# Patient Record
Sex: MALE | Race: WHITE | NOT HISPANIC OR LATINO | Employment: STUDENT | URBAN - METROPOLITAN AREA
[De-identification: names, ages, dates, MRNs, and addresses within clinical notes are randomized per-mention and may not be internally consistent; named-entity substitution may affect disease eponyms.]

---

## 2018-05-25 ENCOUNTER — OFFICE VISIT (OUTPATIENT)
Dept: DERMATOLOGY | Facility: CLINIC | Age: 23
End: 2018-05-25
Payer: COMMERCIAL

## 2018-05-25 DIAGNOSIS — D48.5 NEOPLASM OF UNCERTAIN BEHAVIOR OF SKIN: Primary | ICD-10-CM

## 2018-05-25 NOTE — LETTER
5/25/2018      RE: Tl Decker  [address]  [facility] [address]               Pictures were placed in Pt's chart today for future reference.          Type of service: Telemedicine consult provided at the request of the patient for advice regarding the diagnosis and treatment of the patients skin condition or lesion.     Time of service:     Date: May 29,2018    Time Service Began: 3:15 PM    Time Service Ended: 3:35 PM  Reason that telemedicine is appropriate:.Reason telemedicine is appropriate is patient patient required immediate assessment by a dermatologist for a serious skin concern     Mode of transmission: secure asynchronous Store and Forward transmission of pertinent history and digital images of the patient's skin lesion.       Location of originating and distant sites:    Originating site Patient location: [facility]; Distant site: [facility] (Dr. Sorin Dorsey)    S: Patient noted a dark small lesion on the tip of his right ear about one year ago. Since then, no enlargement of the lesion noted and no signs of symptoms related to the lesion's presence. Apparently no preceding trauma known to the patient.  O: Approximately 0.3 cm lesion on the rim of the right ear that is round and contains some central dark black pigment and on the outer rim of the lesion the color is brown red. Symmetrical in outer shape but asymmetry of color within the borders.  A: While very small, the pigments are dark and somewhat concerning. Differential would be harmless junctional pigmented nevus vs atypical or dysplastic nevus. Apparently the lesion is solitary which is more concerning than if similar lesions were present on the ears or face.   P: Would recommend a shave biopsy of the lesion because of the consideration of it being a  dysplastic nevus.  With patient's consent this could  be accomplishing in the [facility].     RENETTA Dorsey M.D.         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  He will need another surgical excision. He returned the call but is currently in [facility] where he plans to be until December.    I asked him if he could ask his colleagues for the name of a local surgeon or plastic surgeon but if he was not able to find one, I will try to locate one through networking.     CARMEL Dorsey M.D.       Dermatology Nurse

## 2018-05-25 NOTE — NURSING NOTE
How long has it been present? 1 year  Has it grown since you first noticed it? No  Has it bled? No  Is it tender or sore? No  Has it changed color since you first noticed it? No  Do you have other skin lesions nearby that look somewhat similar? No  Do you have a current serious medical problems you are dealing with? No  Roughly how large is it in millimeters? 0.5cm  Anatomic location of the lesion: right ear  Has the lesion ever been removed or treated before by yourself or a medical person? No     Patient understands that he will hear back regarding lesion next week. (Monday clinic is closed). He will contact clinic by Thursday if has not heard back

## 2018-05-25 NOTE — MR AVS SNAPSHOT
After Visit Summary   2018    Tl Decker    MRN: [id number]           Patient Information     Date Of Birth          1995        Visit Information        Provider Department      2018 10:00 AM Nurse,  Dermatology [facility] Dermatology        Today's Diagnoses     Neoplasm of uncertain behavior of skin    -  1       Follow-ups after your visit        Who to contact     Please call your clinic at [phone] to:    Ask questions about your health    Make or cancel appointments    Discuss your medicines    Learn about your test results    Speak to your doctor            Additional Information About Your Visit        MyChart Information     Pigit is an electronic gateway that provides easy, online access to your medical records. With Pigit, you can request a clinic appointment, read your test results, renew a prescription or communicate with your care team.     To sign up for Pigit visit the website at [email or web address]   You will be asked to enter the access code listed below, as well as some personal information. Please follow the directions to create your username and password.     Your access code is: [email or web address]  Expires: 2018  6:30 AM     Your access code will  in 90 days. If you need help or a new code, please contact your [facility] or call [phone] for assistance.        Care EveryWhere ID     This is your Care EveryWhere ID. This could be used by other organizations to access your [place] medical records  [id number]         Blood Pressure from Last 3 Encounters:   No data found for BP    Weight from Last 3 Encounters:   No data found for Wt              Today, you had the following     No orders found for display       Primary Care Provider Fax #    Physician No Ref-Primary [phone]       No address on file        Equal Access to Services     DALTON LEÓN AH: pietro Raymond  smith de la cruz waxdylan maria luisain hayaan kaitlinmitzy meyerjacky aaron. So [facility] [place] [phone].    ATENCIÓN: Si lior acevedo, tiene a diehl disposición servicios gratuitos de asistencia lingüística. Llame al [phone].    We comply with applicable federal civil rights laws and [place] laws. We do not discriminate on the basis of race, color, national origin, age, disability, sex, sexual orientation, or gender identity.            Thank you!     Thank you for choosing [facility] DERMATOLOG[facility]  for your care. Our goal is always to provide you with excellent care. Hearing back from our patients is one way we can continue to improve our services. Please take a few minutes to complete the written survey that you may receive in the mail after your visit with us. Thank you!             Your Updated Medication List - Protect others around you: Learn how to safely use, store and throw away your medicines at www.disposemymeds.org.      Notice  As of 5/25/2018 11:59 PM    You have not been prescribed any medications.

## 2018-05-25 NOTE — LETTER
5/25/2018      RE: Tl Hillemmanuel  [address]  [facility] [address]               Pictures were placed in Pt's chart today for future reference.          Type of service: Telemedicine consult provided at the request of the patient for advice regarding the diagnosis and treatment of the patients skin condition or lesion.     Time of service:     Date: May 29,2018    Time Service Began: 3:15 PM    Time Service Ended: 3:35 PM  Reason that telemedicine is appropriate:.Reason telemedicine is appropriate is patient patient required immediate assessment by a dermatologist for a serious skin concern     Mode of transmission: secure asynchronous Store and Forward transmission of pertinent history and digital images of the patient's skin lesion.       Location of originating and distant sites:    Originating site Patient location: [facility]; Distant site: [facility] (Dr. Sorin Dorsey)    S: Patient noted a dark small lesion on the tip of his right ear about one year ago. Since then, no enlargement of the lesion noted and no signs of symptoms related to the lesion's presence. Apparently no preceding trauma known to the patient.  O: Approximately 0.3 cm lesion on the rim of the right ear that is round and contains some central dark black pigment and on the outer rim of the lesion the color is brown red. Symmetrical in outer shape but asymmetry of color within the borders.  A: While very small, the pigments are dark and somewhat concerning. Differential would be harmless junctional pigmented nevus vs atypical or dysplastic nevus. Apparently the lesion is solitary which is more concerning than if similar lesions were present on the ears or face.   P: Would recommend a shave biopsy of the lesion because of the consideration of it being a  dysplastic nevus.  With patient's consent this could  be accomplishing in the [facility].     RENETTA Dorsey M.D.         Dermatology Nurse

## 2018-05-29 ENCOUNTER — TELEPHONE (OUTPATIENT)
Dept: DERMATOLOGY | Facility: CLINIC | Age: 23
End: 2018-05-29

## 2018-05-29 NOTE — TELEPHONE ENCOUNTER
Outward message stating caller was unavailable. No option to leave VM. Patient had a telederm visit Friday 5/25. Writer trying to reach patient with Dr Dorsey recommendations.    Dr Dorsey would like to see pt this Thursday at 1130 or 1230 for a bx.    If patient calls back please assist him with scheduling.   (0) independent

## 2018-05-29 NOTE — PROGRESS NOTES
Type of service: Telemedicine consult provided at the request of the patient for advice regarding the diagnosis and treatment of the patients skin condition or lesion.     Time of service:     Date: May 29,2018    Time Service Began: 3:15 PM    Time Service Ended: 3:35 PM  Reason that telemedicine is appropriate:.Reason telemedicine is appropriate is patient patient required immediate assessment by a dermatologist for a serious skin concern     Mode of transmission: secure asynchronous Store and Forward transmission of pertinent history and digital images of the patient's skin lesion.       Location of originating and distant sites:    Originating site Patient location: [facility]; Distant site: [facility] (Dr. Sorin Dorsey)    S: Patient noted a dark small lesion on the tip of his right ear about one year ago. Since then, no enlargement of the lesion noted and no signs of symptoms related to the lesion's presence. Apparently no preceding trauma known to the patient.  O: Approximately 0.3 cm lesion on the rim of the right ear that is round and contains some central dark black pigment and on the outer rim of the lesion the color is brown red. Symmetrical in outer shape but asymmetry of color within the borders.  A: While very small, the pigments are dark and somewhat concerning. Differential would be harmless junctional pigmented nevus vs atypical or dysplastic nevus. Apparently the lesion is solitary which is more concerning than if similar lesions were present on the ears or face.   P: Would recommend a shave biopsy of the lesion because of the consideration of it being a  dysplastic nevus.  With patient's consent this could  be accomplishing in the [facility].     RENETTA Dorsey M.D.

## 2018-05-31 ENCOUNTER — OFFICE VISIT (OUTPATIENT)
Dept: DERMATOLOGY | Facility: CLINIC | Age: 23
End: 2018-05-31
Payer: COMMERCIAL

## 2018-05-31 DIAGNOSIS — D22.21 NEVUS OF RIGHT EAR: Primary | ICD-10-CM

## 2018-05-31 ASSESSMENT — PAIN SCALES - GENERAL
PAINLEVEL: MILD PAIN (2)
PAINLEVEL: NO PAIN (0)

## 2018-05-31 NOTE — NURSING NOTE
Chief Complaint   Patient presents with     Derm Problem     Tl is here today for a biopsy on his right ear from telederm.      Edda Mckeon LPN

## 2018-05-31 NOTE — NURSING NOTE
Lidocaine injection   1.0mL once for one use, starting 5/31/2018 ending 5/31/2018,  2mL disp, R-0, injection  Injected by Dr. Dorsey

## 2018-05-31 NOTE — PATIENT INSTRUCTIONS

## 2018-05-31 NOTE — PROGRESS NOTES
SUBJECTIVE:  Alejandro came into our Telederm Clinic several days ago because he had been aware of a lesion on the tip of his right ear on the helix that was dark in color.  The lesion was photographed and was shown to be a roughly 2 mm macule with some features of atypical nevus or dysplastic nevus.  We suggested that he come in for a shave biopsy of this particular lesion.      He came in today, and he states that he is unable to see this, but it has been pointed out by other people that it is something that would be a possible concern.        OBJECTIVE:  On exam, it was a 2 mm, smooth-surfaced, barely raised papule on the very tip of the helix.  Dermoscopy shows a uniform black color with white dots within and no significant pigmentary asymmetry.      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      PLAN:  I recommend that we shave remove the lesion for purposes of biopsy.  I explained that if it should show severe atypia that further surgery might be necessary.  With his permission, I went ahead and anesthetized the site and did a shave removal of the lesion.  There was no pigment at the cut surface.  This lesion was removed.  A specimen was submitted for Dermatopathology.  The bleeding was stopped with ferric and aluminum chloride, and a light dressing was applied and aftercare instructions given.       MEDICATIONS:  Reviewed.        ALLERGIES:  Reviewed.       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      We will call him with the report when we have it available.  We did not do a complete skin exam.

## 2018-05-31 NOTE — LETTER
5/31/2018       RE: Tl Decker  [address]  [place] [address]     Dear Colleague,    Thank you for referring your patient, Tl Decker, to the [facility] DERMATOLOGY at [facility]. Please see a copy of my visit note below.     Dictation on: 05/31/2018  1:09 PM by: BRO SNIDER [[id number]]         Again, thank you for allowing me to participate in the care of your patient.      Sincerely,    BRO Snider MD

## 2018-05-31 NOTE — LETTER
Date:June 8, 2018      Patient was self referred, no letter generated. Do not send.        [facility] Health Information

## 2018-05-31 NOTE — TELEPHONE ENCOUNTER
Patient scheduled with Dr. Dorsey for biopsy at 12:30pm. Nurse called and left voicemail for patient confirming appointment at 12:30 pm.

## 2018-05-31 NOTE — MR AVS SNAPSHOT
After Visit Summary   5/31/2018    Tl Decker    MRN: [id number]           Patient Information     Date Of Birth          1995        Visit Information        Provider Department      5/31/2018 12:30 PM BRO Dorsey MD [facility] Dermatology        Care Instructions    Wound Care After a Biopsy    What is a skin biopsy?  A skin biopsy allows the doctor to examine a very small piece of tissue under the microscope to determine the diagnosis and the best treatment for the skin condition. A local anesthetic (numbing medicine)  is injected with a very small needle into the skin area to be tested. A small piece of skin is taken from the area. Sometimes a suture (stitch) is used.     What are the risks of a skin biopsy?  I will experience scar, bleeding, swelling, pain, crusting and redness. I may experience incomplete removal or recurrence. Risks of this procedure are excessive bleeding, bruising, infection, nerve damage, numbness, thick (hypertrophic or keloidal) scar and non-diagnostic biopsy.    How should I care for my wound for the first 24 hours?    Keep the wound dry and covered for 24 hours    If it bleeds, hold direct pressure on the area for 15 minutes. If bleeding does not stop then go to the emergency room    Avoid strenuous exercise the first 1-2 days or as your doctor instructs you    How should I care for the wound after 24 hours?    After 24 hours, remove the bandage    You may bathe or shower as normal    If you had a scalp biopsy, you can shampoo as usual and can use shower water to clean the biopsy site daily    Clean the wound twice a day with gentle soap and water    Do not scrub, be gentle    Apply white petroleum/Vaseline after cleaning the wound with a cotton swab or a clean finger, and keep the site covered with a Bandaid /bandage. Bandages are not necessary with a scalp biopsy    If you are unable to cover the site with a Bandaid /bandage, re-apply ointment 2-3  times a day to keep the site moist. Moisture will help with healing    Avoid strenuous activity for first 1-2 days    Avoid lakes, rivers, pools, and oceans until the stitches are removed or the site is healed    How do I clean my wound?    Wash hands thoroughly with soap or use hand  before all wound care    Clean the wound with gentle soap and water    Apply white petroleum/Vaseline  to wound after it is clean    Replace the Bandaid /bandage to keep the wound covered for the first few days or as instructed by your doctor    If you had a scalp biopsy, warm shower water to the area on a daily basis should suffice    What should I use to clean my wound?     Cotton-tipped applicators (Qtips )    White petroleum jelly (Vaseline ). Use a clean new container and use Q-tips to apply.    Bandaids   as needed    Gentle soap     How should I care for my wound long term?    Do not get your wound dirty    Keep up with wound care for one week or until the area is healed.    A small scab will form and fall off by itself when the area is completely healed. The area will be red and will become pink in color as it heals. Sun protection is very important for how your scar will turn out. Sunscreen with an SPF 30 or greater is recommended once the area is healed.    You should have some soreness but it should be mild and slowly go away over several days. Talk to your doctor about using tylenol for pain,    When should I call my doctor?  If you have increased:     Pain or swelling    Pus or drainage (clear or slightly yellow drainage is ok)    Temperature over 100F    Spreading redness or warmth around wound    When will I hear about my results?  The biopsy results can take 2-3 weeks to come back. The clinic will call you with the results, send you a [email or web address] message, or have you schedule a follow-up clinic or phone time to discuss the results. Contact our clinics if you do not hear from us in 3 weeks.     Who should I call  with questions?    [facility]: [phone]     [facility]: [phone]    For urgent needs outside of business hours call the [facility] at [phone] and ask for the dermatology resident on call              Follow-ups after your visit        Who to contact     Please call your clinic at [phone] to:    Ask questions about your health    Make or cancel appointments    Discuss your medicines    Learn about your test results    Speak to your doctor            Additional Information About Your Visit        DataSiftharPushCall Information     Upstream is an electronic gateway that provides easy, online access to your medical records. With Upstream, you can request a clinic appointment, read your test results, renew a prescription or communicate with your care team.     To sign up for Upstream visit the website at [email or web address]   You will be asked to enter the access code listed below, as well as some personal information. Please follow the directions to create your username and password.     Your access code is: [email or web address]  Expires: 2018  6:30 AM     Your access code will  in 90 days. If you need help or a new code, please contact your [facility] or call [phone] for assistance.        Care EveryWhere ID     This is your Care EveryWhere ID. This could be used by other organizations to access your [place] medical records  [id number]         Blood Pressure from Last 3 Encounters:   No data found for BP    Weight from Last 3 Encounters:   No data found for Wt              Today, you had the following     No orders found for display       Primary Care Provider Fax #    Physician No Ref-Primary [phone]       No address on file        Equal Access to Services     DALTON LEÓN : Lety Del Rosario, pietro d ela cruz, mann walker  ah. So [facility] [phone].    ATENCIÓN: Si habla kristina, tiene a diehl disposición servicios gratuitos de asistencia lingüística. Llsaurabh al [phone].    We comply with applicable federal civil rights laws and [place] laws. We do not discriminate on the basis of race, color, national origin, age, disability, sex, sexual orientation, or gender identity.            Thank you!     Thank you for choosing [facility] DERMATOLOG[facility]  for your care. Our goal is always to provide you with excellent care. Hearing back from our patients is one way we can continue to improve our services. Please take a few minutes to complete the written survey that you may receive in the mail after your visit with us. Thank you!             Your Updated Medication List - Protect others around you: Learn how to safely use, store and throw away your medicines at www.disposemymeds.org.      Notice  As of 5/31/2018  1:09 PM    You have not been prescribed any medications.

## 2018-05-31 NOTE — TELEPHONE ENCOUNTER
"[facility] Call Center    Phone Message    May a detailed message be left on voicemail: yes    Reason for Call: Other: Patient is calling to say yes, he can make it in today at either the 11:30 AM or 12:30 PM spot, both times work. I \"\" was unable to book this appt. as those times offered do not show up in manual booking. Please follow-up with patient.      Action Taken: Message routed to:  [facility] derm    "

## 2018-06-08 LAB — COPATH REPORT: NORMAL

## 2018-06-14 NOTE — PROGRESS NOTES
I called Tl with the diagnosis of melanoma on his ear tip.  He will need another surgical excision. He returned the call but is currently in [facility] where he plans to be until December.    I asked him if he could ask his colleagues for the name of a local surgeon or plastic surgeon but if he was not able to find one, I will try to locate one through networking.     CARMEL Dorsey M.D.

## 2018-07-05 ENCOUNTER — TRANSFERRED RECORDS (OUTPATIENT)
Dept: HEALTH INFORMATION MANAGEMENT | Facility: CLINIC | Age: 23
End: 2018-07-05

## 2018-07-12 NOTE — PROGRESS NOTES
Tl had his melanoma site further treated in [facility] on July 4, 2018 by Uzair Andrews and Ivanna.     The op note is scanned into his EPIC record.    I hope to see Alejandro in 6 months after he returns to the [place].    RENETTA Dorsey M.D.

## 2019-01-10 ENCOUNTER — OFFICE VISIT (OUTPATIENT)
Dept: DERMATOLOGY | Facility: CLINIC | Age: 24
End: 2019-01-10
Payer: COMMERCIAL

## 2019-01-10 DIAGNOSIS — L90.5 SCAR: ICD-10-CM

## 2019-01-10 DIAGNOSIS — Z85.820 HISTORY OF MELANOMA: Primary | ICD-10-CM

## 2019-01-10 DIAGNOSIS — D22.9 MULTIPLE BENIGN NEVI: ICD-10-CM

## 2019-01-10 ASSESSMENT — PAIN SCALES - GENERAL: PAINLEVEL: NO PAIN (0)

## 2019-01-10 NOTE — PROGRESS NOTES
SUBJECTIVE:  Alejandro comes in for a recheck.  He is a gentleman whom we saw last year for a suspicious lesion on the tip of the right ear helix.  Biopsy revealed a very atypical nevus, possibly an early melanoma, Breslow level 0.3 mm.  No other parameters suggested a serious issue but bacause melanoma was in the differential, we favored protocol for melanoma surgery.   E.  At the time the biopsy came back, he was in [place], and so through my colleague, Dr. Sutherland, we were able to find a surgical colleague in [place].  He had the area removed with a healthy margin.  He comes in today for followup.      OBJECTIVE:  Shows a very healthy and pleasant gentleman in no distress.  We checked his ear very carefully, and his head and neck.  The ear does have a surgically created  notch  but there is no evidence of any pigmented areas of involvement on the ear, inside the conchal bowl or near the ear on the scalp.  We checked the scalp very carefully.  Left ear is completely normal.  No evidence of any periocular, perinasal pathology.  Checked his chest, his back, legs, buttocks.  Did not check genitals.        ASSESSMENT:  Resolved 0.3 mm probable superficial spreading melanoma versus a highly atypical nevus excised and resolved in [place].      PLAN:  Reassured.  Suggested 6-month followup.  Call should he notice any dark lesions as he had noticed that lesion prior to coming in.  Meds and allergies reviewed.

## 2019-01-10 NOTE — LETTER
1/10/2019       RE: Tl Decker  [address]  [place] [address]     Dear Colleague,    Thank you for referring your patient, Tl Decker, to the [facility] DERMATOLOGY at [facility]. Please see a copy of my visit note below.    SUBJECTIVE:  Alejandro comes in for a recheck.  He is a gentleman whom we saw last year for a suspicious lesion on the tip of the right ear helix.  Biopsy revealed a very atypical nevus, possibly an early melanoma, Breslow level 0.3 mm.  No other parameters suggested a serious issue but bacause melanoma was in the differential, we favored protocol for melanoma surgery.   E.  At the time the biopsy came back, he was in [place], and so through my colleague, Dr. Sutherland, we were able to find a surgical colleague in [place].  He had the area removed with a healthy margin.  He comes in today for followup.      OBJECTIVE:  Shows a very healthy and pleasant gentleman in no distress.  We checked his ear very carefully, and his head and neck.  The ear does have a surgically created  notch  but there is no evidence of any pigmented areas of involvement on the ear, inside the conchal bowl or near the ear on the scalp.  We checked the scalp very carefully.  Left ear is completely normal.  No evidence of any periocular, perinasal pathology.  Checked his chest, his back, legs, buttocks.  Did not check genitals.        ASSESSMENT:  Resolved 0.3 mm probable superficial spreading melanoma versus a highly atypical nevus excised and resolved in [place].      PLAN:  Reassured.  Suggested 6-month followup.  Call should he notice any dark lesions as he had noticed that lesion prior to coming in.  Meds and allergies reviewed.           Again, thank you for allowing me to participate in the care of your patient.      Sincerely,    BRO Dorsey MD

## 2019-01-10 NOTE — LETTER
Date:January 18, 2019      Patient was self referred, no letter generated. Do not send.        [facility] Health Information

## 2019-01-10 NOTE — NURSING NOTE
Dermatology Rooming Note    Tl Decker's goals for this visit include:   Chief Complaint   Patient presents with     Derm Problem     Tl is here today for a follow up for a biopsy that we did      JOSSUE Gurrola

## 2020-02-27 ENCOUNTER — OFFICE VISIT (OUTPATIENT)
Dept: DERMATOLOGY | Facility: CLINIC | Age: 25
End: 2020-02-27
Payer: COMMERCIAL

## 2020-02-27 DIAGNOSIS — Z12.83 SCREENING FOR SKIN CANCER: ICD-10-CM

## 2020-02-27 DIAGNOSIS — Z85.820 HISTORY OF MELANOMA: Primary | ICD-10-CM

## 2020-02-27 ASSESSMENT — PAIN SCALES - GENERAL: PAINLEVEL: NO PAIN (0)

## 2020-02-27 NOTE — NURSING NOTE
Chief Complaint   Patient presents with     Skin Check     Tl is here today for a general skin exam.      CHARLOTTE VILCHIS on 2/27/2020 at 4:10 PM

## 2020-02-27 NOTE — PROGRESS NOTES
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  This was removed in [facility] after the biopsy was done here at our clinic.  He has had no problem with that site, but he comes in for an annual skin exam because of his developing a true melanoma at a young age.  He states that he has seen no lesions of concern since last visit a year ago.      OBJECTIVE:  Shows a healthy young man in no distress.  We checked his face, neck, chest, back, arms, legs, hands, feet, buttocks.  We found no pigmented lesions of concern.  The left ear where he had his melanoma removed shows a slight notch following the surgery, but there is no evidence of any pigmentation in or around the site of surgery or on the neck.        ASSESSMENT:  Melanoma external ear, resolved.  Alejandro does not show any evidence of sun damage. 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     Note the lesion was biopsied in 05/2019 so he is almost 2 years beyond that initial visit and his surgery which occurred about 6 weeks later in [facility].      Meds and allergies reviewed.      Return 1 year.  Asked Alejandro to call us sooner should he note any unusual skin lesions.

## 2020-02-27 NOTE — LETTER
2/27/2020       RE: Tl Decker  [address]  [place] [address]     Dear Colleague,    Thank you for referring your patient, Tl Decker, to the [facility] DERMATOLOGY at [facility]. Please see a copy of my visit note below.    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  This was removed in [facility] after the biopsy was done here at our clinic.  He has had no problem with that site, but he comes in for an annual skin exam because of his developing a true melanoma at a young age.  He states that he has seen no lesions of concern since last visit a year ago.      OBJECTIVE:  Shows a healthy young man in no distress.  We checked his face, neck, chest, back, arms, legs, hands, feet, buttocks.  We found no pigmented lesions of concern.  The left ear where he had his melanoma removed shows a slight notch following the surgery, but there is no evidence of any pigmentation in or around the site of surgery or on the neck.        ASSESSMENT:  Melanoma external ear, resolved.  Alejandro does not show any evidence of sun damage. 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     Note the lesion was biopsied in 05/2019 so he is almost 2 years beyond that initial visit and his surgery which occurred about 6 weeks later in [facility].      Meds and allergies reviewed.      Return 1 year.  Asked Alejandro to call us sooner should he note any unusual skin lesions.           Again, thank you for allowing me to participate in the care of your patient.      Sincerely,    BRO Dorsey MD

## 2020-02-27 NOTE — LETTER
Date:March 5, 2020      Patient was self referred, no letter generated. Do not send.        [facility] Physicians Health Information

## 2021-04-29 ENCOUNTER — OFFICE VISIT (OUTPATIENT)
Dept: DERMATOLOGY | Facility: CLINIC | Age: 26
End: 2021-04-29
Payer: COMMERCIAL

## 2021-04-29 DIAGNOSIS — Z85.820 HISTORY OF MELANOMA: Primary | ICD-10-CM

## 2021-04-29 DIAGNOSIS — D22.9 MULTIPLE BENIGN NEVI: ICD-10-CM

## 2021-04-29 DIAGNOSIS — Z12.83 SCREENING FOR SKIN CANCER: ICD-10-CM

## 2021-04-29 PROCEDURE — 99213 OFFICE O/P EST LOW 20 MIN: CPT | Performed by: DERMATOLOGY

## 2021-04-29 ASSESSMENT — PAIN SCALES - GENERAL: PAINLEVEL: NO PAIN (0)

## 2021-04-29 NOTE — LETTER
4/29/2021       RE: Tl Decker  [address]  [place] [address]     Dear Colleague,    Thank you for referring your patient, Tl Decker, to the [facility] DERMATOLOGY CLINIC [facility]. Please see a copy of my visit note below.     Dictation on: 04/29/2021 12:46 PM by: BRO SNIDER [[id number]]           Again, thank you for allowing me to participate in the care of your patient.      Sincerely,    BRO Snider MD

## 2021-04-29 NOTE — PROGRESS NOTES
Alejandro returns for reevaluation.  He is a young gentleman who 2 years ago in 2018 presented with an unusual lesion on his right ear.  Biopsy revealed a melanoma.  At the time, I was able to reach him.  He was in [place] and we were able to coordinate an appropriate excision of this superficial melanoma in [place].  Since that time, he has had no further trouble with the area, although he has had a notch in the ear.  He comes in today for his annual recheck visit.  He states he has noted no lesions of concern other than 1 on his right abdominal wall, which has been changing color recently but resumed its normal fleshy color in the last few days.      He is a very healthy and pleasant gentleman in no distress.  We checked his scalp, face, ears very carefully, right and left.  Right is where he has the notch from the surgery.  We checked his face, neck, chest, back, arms, legs, hands, feet.  We did not check the buttocks or genitals.  He denies any lesions in those areas that he is aware of.  The lesion of recent concern was on his right abdomen and is a 1.5 mm domed flesh colored nevus that looks very innocent.  He had a few other nevi on the chest and back, none of which showed any atypicality pigment wise.      A. Resolved melanoma, right ear.  B.  Reassured.  Return again in 1 year.  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      Meds and allergies were reviewed.      I advised Alejandro to call us anytime should he notice any lesions of concern.

## 2021-04-29 NOTE — NURSING NOTE
Dermatology Rooming Note    Tl Decker's goals for this visit include:   Chief Complaint   Patient presents with     Skin Check     Tl is here today for a ull body skin check      JOSSUE Gurrola

## 2021-06-19 ENCOUNTER — HEALTH MAINTENANCE LETTER (OUTPATIENT)
Age: 26
End: 2021-06-19

## 2021-10-10 ENCOUNTER — HEALTH MAINTENANCE LETTER (OUTPATIENT)
Age: 26
End: 2021-10-10

## 2022-05-12 ENCOUNTER — OFFICE VISIT (OUTPATIENT)
Dept: DERMATOLOGY | Facility: CLINIC | Age: 27
End: 2022-05-12
Payer: COMMERCIAL

## 2022-05-12 DIAGNOSIS — L81.4 SOLAR LENTIGO: Primary | ICD-10-CM

## 2022-05-12 DIAGNOSIS — Z87.898 HISTORY OF ATYPICAL NEVUS: ICD-10-CM

## 2022-05-12 DIAGNOSIS — D22.9 MULTIPLE BENIGN NEVI: ICD-10-CM

## 2022-05-12 PROCEDURE — 99213 OFFICE O/P EST LOW 20 MIN: CPT | Performed by: DERMATOLOGY

## 2022-05-12 ASSESSMENT — PAIN SCALES - GENERAL: PAINLEVEL: NO PAIN (0)

## 2022-05-12 NOTE — PATIENT INSTRUCTIONS
Patient Education     Checking for Skin Cancer  You can find cancer early by checking your skin each month. There are 3 kinds of skin cancer. They are melanoma, basal cell carcinoma, and squamous cell carcinoma. Doing monthly skin checks is the best way to find new marks or skin changes. Follow the instructions below for checking your skin.   The ABCDEs of checking moles for melanoma   Check your moles or growths for signs of melanoma using ABCDE:   Asymmetry: the sides of the mole or growth don t match  Border: the edges are ragged, notched, or blurred  Color: the color within the mole or growth varies  Diameter: the mole or growth is larger than 6 mm (size of a pencil eraser)  Evolving: the size, shape, or color of the mole or growth is changing (evolving is not shown in the images below)    Checking for other types of skin cancer  Basal cell carcinoma or squamous cell carcinoma have symptoms such as:     A spot or mole that looks different from all other marks on your skin  Changes in how an area feels, such as itching, tenderness, or pain  Changes in the skin's surface, such as oozing, bleeding, or scaliness  A sore that does not heal  New swelling or redness beyond the border of a mole    Who s at risk?  Anyone can get skin cancer. But you are at greater risk if you have:   Fair skin, light-colored hair, or light-colored eyes  Many moles or abnormal moles on your skin  A history of sunburns from sunlight or tanning beds  A family history of skin cancer  A history of exposure to radiation or chemicals  A weakened immune system  If you have had skin cancer in the past, you are at risk for recurring skin cancer.   How to check your skin  Do your monthly skin checkups in front of a full-length mirror. Check all parts of your body, including your:   Head (ears, face, neck, and scalp)  Torso (front, back, and sides)  Arms (tops, undersides, upper, and lower armpits)  Hands (palms, backs, and fingers, including  under the nails)  Buttocks and genitals  Legs (front, back, and sides)  Feet (tops, soles, toes, including under the nails, and between toes)  If you have a lot of moles, take digital photos of them each month. Make sure to take photos both up close and from a distance. These can help you see if any moles change over time.   Most skin changes are not cancer. But if you see any changes in your skin, call your doctor right away. Only he or she can diagnose a problem. If you have skin cancer, seeing your doctor can be the first step toward getting the treatment that could save your life.   [employer] last reviewed this educational content on 4/1/2019 2000-2020 [employer] [address] [place] [address], [place] [address]. All rights reserved. This information is not intended as a substitute for professional medical care. Always follow your healthcare professional's instructions.       When should I call my doctor?  If you are worsening or not improving, please, contact us or seek urgent care as noted below.     Who should I call with questions (adults)?  [facility] (adult and pediatric): [phone]  [facility] (adult): [phone]  For urgent needs outside of business hours call the [facility] at [phone] and ask for the dermatology resident on call to be paged  If this is a medical emergency and you are unable to reach an ER, Call [phone]    Who should I call with questions (pediatric)?  [facility]- Pediatric Dermatology  Dr. Kaylee Potts, Dr. Bill Mehta, Dr. Violeta Gonzalez, BLACK Whitaker, Dr. Yanci Mederos, Dr. Jaclyn Mello & Dr. Duarte Lee  Non-urgent nurse triage line; [phone]- Susy and Bre ALVAREZ Care Coordinatorjose Franco (/Complex ) [phone]    If you need a prescription refill, please contact your pharmacy. Refills are approved or denied by our  Physicians during normal business hours, Monday through Fridays  Per office policy, refills will not be granted if you have not been seen within the past year (or sooner depending on your child's condition)    Scheduling Information:  Pediatric Appointment Scheduling and Call Center [phone]  Radiology Scheduling- [phone]  Sedation Unit Scheduling- [phone]  Fred Scheduling- General [phone]; Pediatric Dermatology [phone]  Main  Services: [phone]  Arabic: [phone]  Turkmen: [phone]  Hmong/Djiboutian/Mohawk: [phone]  Preadmission Nursing Department Fax Number: [phone] (Fax all pre-operative paperwork to this number)    For urgent matters arising during evenings, weekends, or holidays that cannot wait for normal business hours please call [phone] and ask for the dermatology resident on call to be paged.

## 2022-05-12 NOTE — LETTER
5/12/2022       RE: Tl Decker  [address]  [place] [address]     Dear Colleague,    Thank you for referring your patient, Tl Decker, to the [facility] DERMATOLOGY CLINIC [place] at [facility]. Please see a copy of my visit note below.    [facility] Dermatology Note  Encounter Date: May 12, 2022  Office Visit     Dermatology Problem List:  1. History of severely atypical compound melanocytic proliferation, R ear helix, s/p excision in [place] (7/18)  ____________________________________________    Assessment & Plan:    # Benign lesions: Multiple benign nevi, solar lentigos. Explained to patient benign nature of lesion. No treatment is necessary at this time unless the lesion changes or becomes symptomatic.   - ABCDs of melanoma were discussed and self skin checks were advised.  - Sun precaution was advised including the use of sun screens of SPF 30 or higher, sun protective clothing, and avoidance of tanning beds.    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 No evidence of clinical recurrence on exam today.   - ABCDs of melanoma were discussed and self skin checks were advised.  - Sun precaution was advised including the use of sun screens of SPF 30 or higher, sun protective clothing, and avoidance of tanning beds.  - Continua annual skin examinations    Procedures Performed:   None    Follow-up: 1 year(s) in-person, or earlier for new or changing lesions    Staff and Scribe:     Scribe Disclosure:  I, Moon Crandall, am serving as a scribe to prep the notes for Jean Friedman MD.     Provider Disclosure:   The documentation recorded by the scribe accurately reflects the services I personally performed and the decisions made by me.    Jean Friedman MD    Department of Dermatology  [facility]  [facility]: Phone: [phone], Fax:[phone]  [facility]: Phone: [phone] Fax: [phone]  ____________________________________________    CC: Skin Check (Tl is here for a FBSE, patient hx melanoma, reports no concerns today)    HPI:  Mr. Tl Decker is a(n) 26 year old male who presents today as a return patient for [facility]. Last seen by Dr. Cantu on 4/29/21 at which point he had a benign skin exam.     He reports no areas of concern today.    The patient otherwise denies any new or concerning lesions. No bleeding, painful, pruritic, or changing lesions. They report no personal history of skin cancer, but has had atypical nevi in the past. There is no family history of skin cancer. No history of immunosuppression. No history of indoor tanning. They do use sunscreen and protective clothing when outdoors for sun protection. No occupational exposure to ultraviolet light or other forms of radiation. Patient is a PhD student in Particle Physics. Health otherwise stable. No other skin concerns.     Labs Reviewed:  N/A    Physical Exam:  Vitals: There were no vitals taken for this visit.  SKIN: Full skin, which includes the head/face, both arms, chest, back, abdomen,both legs, genitalia and/or groin buttocks, digits and/or nails, was examined.  - Brown macules on sun exposed areas  - Brown macules and papules on trunk and extremities without concerning dermoscopy features  - Well healed scar on the right helix at prior severely atypical nevus surgical site without evidence of recurrence.   - No other lesions of concern on areas examined.     Medications:  No current outpatient medications on file.     No current facility-administered medications for this visit.      Past Medical History:   There is no problem list on file for this patient.    No past medical history on file.     CC MD DHAVAL Phillip  HEALTH SERVICES  [address] [place] [id number] on close of this encounter.

## 2022-05-12 NOTE — NURSING NOTE
Dermatology Rooming Note    Tl Decker's goals for this visit include:   Chief Complaint   Patient presents with     Skin Check     Tl is here for a FBSE, patient hx melanoma, reports no concerns today     Yadira Hodge, EMT

## 2022-05-12 NOTE — PROGRESS NOTES
[facility] Dermatology Note  Encounter Date: May 12, 2022  Office Visit     Dermatology Problem List:  1. History of severely atypical compound melanocytic proliferation, R ear helix, s/p excision in [place] (7/18)  ____________________________________________    Assessment & Plan:    # Benign lesions: Multiple benign nevi, solar lentigos. Explained to patient benign nature of lesion. No treatment is necessary at this time unless the lesion changes or becomes symptomatic.   - ABCDs of melanoma were discussed and self skin checks were advised.  - Sun precaution was advised including the use of sun screens of SPF 30 or higher, sun protective clothing, and avoidance of tanning beds.    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 No evidence of clinical recurrence on exam today.   - ABCDs of melanoma were discussed and self skin checks were advised.  - Sun precaution was advised including the use of sun screens of SPF 30 or higher, sun protective clothing, and avoidance of tanning beds.  - Continua annual skin examinations    Procedures Performed:   None    Follow-up: 1 year(s) in-person, or earlier for new or changing lesions    Staff and Scribe:     Scribe Disclosure:  I, Moon Crandall, am serving as a scribe to prep the notes for Jean Friedman MD.     Provider Disclosure:   The documentation recorded by the scribe accurately reflects the services I personally performed and the decisions made by me.    Jean Friedman MD    Department of Dermatology  [facility]: Phone: [phone], Fax:[phone]  [facility]: Phone: [phone] Fax: [phone]  ____________________________________________    CC: Skin Check (Tl is here for a FBSE, patient hx melanoma, reports no concerns today)    HPI:    Tl Decker is a(n) 26 year old male who presents today as a return patient for [facility]. Last seen by Dr. Cantu on 4/29/21 at which point he had a benign skin exam.     He reports no areas of concern today.    The patient otherwise denies any new or concerning lesions. No bleeding, painful, pruritic, or changing lesions. They report no personal history of skin cancer, but has had atypical nevi in the past. There is no family history of skin cancer. No history of immunosuppression. No history of indoor tanning. They do use sunscreen and protective clothing when outdoors for sun protection. No occupational exposure to ultraviolet light or other forms of radiation. Patient is a PhD student in Particle Physics. Health otherwise stable. No other skin concerns.     Labs Reviewed:  N/A    Physical Exam:  Vitals: There were no vitals taken for this visit.  SKIN: Full skin, which includes the head/face, both arms, chest, back, abdomen,both legs, genitalia and/or groin buttocks, digits and/or nails, was examined.  - Brown macules on sun exposed areas  - Brown macules and papules on trunk and extremities without concerning dermoscopy features  - Well healed scar on the right helix at prior severely atypical nevus surgical site without evidence of recurrence.   - No other lesions of concern on areas examined.     Medications:  No current outpatient medications on file.     No current facility-administered medications for this visit.      Past Medical History:   There is no problem list on file for this patient.    No past medical history on file.     CC Janny Hernandez MD  [address] [address] on close of this encounter.

## 2022-07-16 ENCOUNTER — HEALTH MAINTENANCE LETTER (OUTPATIENT)
Age: 27
End: 2022-07-16

## 2022-09-18 ENCOUNTER — HEALTH MAINTENANCE LETTER (OUTPATIENT)
Age: 27
End: 2022-09-18

## 2023-01-20 ENCOUNTER — TELEPHONE (OUTPATIENT)
Dept: DERMATOLOGY | Facility: CLINIC | Age: 28
End: 2023-01-20
Payer: COMMERCIAL

## 2023-07-30 ENCOUNTER — HEALTH MAINTENANCE LETTER (OUTPATIENT)
Age: 28
End: 2023-07-30

## 2024-09-22 ENCOUNTER — HEALTH MAINTENANCE LETTER (OUTPATIENT)
Age: 29
End: 2024-09-22